# Patient Record
Sex: MALE | Race: WHITE | NOT HISPANIC OR LATINO | Employment: OTHER | URBAN - METROPOLITAN AREA
[De-identification: names, ages, dates, MRNs, and addresses within clinical notes are randomized per-mention and may not be internally consistent; named-entity substitution may affect disease eponyms.]

---

## 2017-08-07 NOTE — PATIENT DISCUSSION
disc with patient decrease reliability of so's due to prev lvc. may need lvc enh po to increase bcva.

## 2017-11-28 NOTE — PATIENT DISCUSSION
The patient feels that the cataract is significantly impacting daily activities and has elected cataract surgery. The risks, benefits, and alternatives to surgery were discussed. The patient elects to proceed with surgery. Ventricular tachycardia

## 2021-04-08 ENCOUNTER — PREPPED CHART (OUTPATIENT)
Dept: URBAN - METROPOLITAN AREA CLINIC 35 | Facility: CLINIC | Age: 66
End: 2021-04-08

## 2021-05-03 NOTE — PATIENT DISCUSSION
CME Prophy before IOL SX OS Ilevro QD 2d before sx and 4 wk after sx. OCCUPATIONAL THERAPY EVALUATION  Patient: Echo Calabrese (38 y.o. female)  Date: 5/3/2021  Primary Diagnosis: Accelerated hypertension [I10]        Precautions: fall risk       ASSESSMENT  Pt is a 65 y/o F with PMH of CVA, diabetes mellitus, hypertension, heart failure presents to the [facility] ED complaining of not feeling good and weak. States that she was keep falling, having trouble with ambulation. Denies any chest pain, palpitations. Per medical chart, on evaluation found that she has significantly elevated blood pressure and due to the risk factors admitted for further management. She was comfortably laying in the bed, did not notice any trouble breathing or shortness of breath. Was given hydralazine and clonidine in the emergency room with not much improvement. Pt admitted 4/30/21 and being treated for accelerated hypertension. Pt received semi-supine in bed upon arrival, AXO x4, and agreeable to OT/PT evaluation at this time. Per pt report, pt lives alone in a one-story home with 3 JAMES and B HR, was IND with ADLs and Mod I using RW for mobility at [facility]. Per pt/chart, pt was recently admitted to [facility] in February, treated for CVA, pt d/c to [facility] 2/17/21. Pt states she was there for \"about 40 days\" then was d/c home and receiving HHPT/OT and speech services prior to arrival. Pt reports she has a  that assist with IADLs, also owns a RW and SPC. Based on current observations, pt presents with deficits in generalized strength/AROM, static/dynamic sitting balance, bed mobility, functional activity tolerance, and reported increased B foot pain impacting overall performance of ADLs and functional transfers/mobility. Pt currently requires SBA supine>sit with fair sitting balance noted EOB; able to pull up/adjust socks at ankles, however with limited anterior reach anticipated min A to don socks at this time.  Pt scoots forward with CGA, tolerates approx 10 minutes seated EOB, attempted STS however pt reports B foot pain and unable to progress to OOB transfer at this time. Sit>supine completed with CGA back into bed and pt performs basic face washing task s/p setup in long sitting. Overall, pt tolerates session fair today, limited mostly by pain to B feet today, RN made aware. Pt would benefit from continued skilled OT services to address current impairments and improve overall IND and safety with self cares and functional transfers/mobility. Recommend discharge to SNF vs. [facility] pending progress during hospital stay and once medically appropriate. Other factors to consider for discharge: family/social support, DME, time since onset, severity of deficits      Patient will benefit from skilled therapy intervention to address the above noted impairments. PLAN :  Recommendations and Planned Interventions: self care training, functional mobility training, therapeutic exercise, balance training, therapeutic activities, endurance activities, neuromuscular re-education, patient education, home safety training, and family training/education    Frequency/Duration: Patient will be followed by occupational therapy 5 times a week to address goals.     Recommendation for discharge: (in order for the patient to meet his/her long term goals)  SNF vs. [facility] pending progress    This discharge recommendation:  Has been made in collaboration with the attending provider and/or case management    IF patient discharges home will need the following DME: TBD       SUBJECTIVE:   Patient stated I can't stand right now my feet hurt    OBJECTIVE DATA SUMMARY:   HISTORY:   Past Medical History:   Diagnosis Date    Blindness/low vision 2008    due to dm    CHF (congestive heart failure) ([facility] [address]     Chronic kidney disease     Chronic pain     SPINAL STENOSIS    DM (diabetes mellitus) ([facility] [address]     HTN (hypertension)     Stroke [facility])      Past Surgical History:   Procedure Laterality Date    HX OOPHORECTOMY      STENT INSERTION [id number]    Blanca Taran       Expanded or extensive additional review of patient history:     Home Situation  Home Environment: Private residence  # Steps to Enter: 3  Rails to Enter: Yes  One/Two Story Residence: One story  Living Alone: Yes  Support Systems: Other (comments)(caregiver 3 x week)  Patient Expects to be Discharged to[de-identified] Other (comment)(TBD)  Current DME Used/Available at Home: Walker, rolling    EXAMINATION OF PERFORMANCE DEFICITS:  Cognitive/Behavioral Status:  Neurologic State: Alert  Orientation Level: Oriented X4  Cognition: Follows commands    Hearing: Auditory  Auditory Impairment: None    Vision/Perceptual:     WFL      Range of Motion:  AROM: Generally decreased, functional    Strength:  Strength: Generally decreased, functional    Coordination:  Fine Motor Skills-Upper: Left Intact; Right Intact    Gross Motor Skills-Upper: Left Intact; Right Intact    Tone & Sensation:  Sensation: Intact    Balance:  Sitting: Impaired; With support  Sitting - Static: Good (unsupported)  Sitting - Dynamic: Fair (occasional)    Functional Mobility and Transfers for ADLs:  Bed Mobility:  Rolling: Stand-by assistance  Supine to Sit: Stand-by assistance  Sit to Supine: Minimum assistance  Scooting: Contact guard assistance    ADL Intervention and task modifications:  Grooming  Grooming Assistance: Set-up; Stand-by assistance  Position Performed: Long sitting on bed  Washing Face: Set-up; Stand-by assistance    Lower Body Dressing Assistance  Socks: Minimum assistance(Simulated; able to reach ankles)  Position Performed: Seated edge of bed    Therapeutic Exercise:  Pt would benefit from UE HEP to improve overall UE AROM/strength and can be further educated in next treatment session. Functional Measure:    [address] [facility] [address] [address] AM-PACTM \"6 Clicks\"                                                       Daily Activity Inpatient Short Form  How much help from another person does the patient currently need. ..  Total; A Lot A Little None   1. Putting on and taking off regular lower body clothing? []  1 [x]  2 []  3 []  4   2. Bathing (including washing, rinsing, drying)? []  1 [x]  2 []  3 []  4   3. Toileting, which includes using toilet, bedpan or urinal? [] 1 [x]  2 []  3 []  4   4. Putting on and taking off regular upper body clothing? []  1 []  2 [x]  3 []  4   5. Taking care of personal grooming such as brushing teeth? []  1 []  2 [x]  3 []  4   6. Eating meals? []  1 []  2 [x]  3 []  4   © , Trustees of [facility] [employer], under license to [employer]. All rights reserved     Score: 15/24     Interpretation of Tool:  Represents clinically-significant functional categories (i.e. Activities of daily living). Percentage of Impairment CH    0%   CI    1-19% CJ    20-39% CK    40-59% CL    60-79% CM    80-99% CN     100%   [facility]  Score 6-24 24 23 20-22 15-19 10-14 7-9 6        Occupational Therapy Evaluation Charge Determination   History Examination Decision-Making   LOW Complexity : Brief history review  LOW Complexity : 1-3 performance deficits relating to physical, cognitive , or psychosocial skils that result in activity limitations and / or participation restrictions  MEDIUM Complexity : Patient may present with comorbidities that affect occupational performnce. Miniml to moderate modification of tasks or assistance (eg, physical or verbal ) with assesment(s) is necessary to enable patient to complete evaluation       Based on the above components, the patient evaluation is determined to be of the following complexity level: LOW   Pain Ratin/10 B feet    Activity Tolerance:   Fair  Please refer to the flowsheet for vital signs taken during this treatment.     After treatment patient left in no apparent distress:    Supine in bed, Call bell within reach, Bed / chair alarm activated, and Side rails x 3    COMMUNICATION/EDUCATION:   The patients plan of care was discussed with: Physical therapist and Registered nurse. Patient/family have participated as able in goal setting and plan of care. and Patient/family agree to work toward stated goals and plan of care. This patients plan of care is appropriate for delegation to TODD.     OT/PT sessions occurred together for increased patient and clinician safety as pt requires A of 2 for mobility at this time    Thank you for this referral.  John Luther  Time Calculation: 25 mins   Problem: Self Care Deficits Care Plan (Adult)  Goal: *Acute Goals and Plan of Care (Insert Text)  Description: Pt will be IND sup <> sit in prep for EOB ADLs  Pt will be Mod I grooming standing sink side LRAD  Pt will be Mod I LE dressing sitting EOB/long sit  Pt will be Mod I sit <>  prep for toileting LRAD  Pt will be Mod I toileting/toilet transfer/cloth mgmt LRAD  Pt will be IND following UE HEP in prep for self care tasks     Outcome: Not Met

## 2025-07-29 NOTE — PATIENT DISCUSSION
"Stefanie Clayton (76 y.o. Female)     Bryanna Rodrigues, RN  Utilization Review  [phone]  [phone]      OBS to INPT request           Date of Birth   1949    Social Security Number       Address   [address] [place] [address]    Home Phone   [phone]    MRN   [id number]       Restorationist   [facility]    Marital Status                               Admission Date   2025    Admission Type   Emergency    Admitting Provider   Dasha Spangler DO    Attending Provider   Dasha Spangler DO    Department, Room/Bed   [facility] 6B, N640/1       Discharge Date       Discharge Disposition       Discharge Destination                                 Attending Provider: Dasha Spangler DO    Allergies: No Known Allergies    Isolation: None   Infection: None   Code Status: CPR    Ht: 144.8 cm (57.01\")   Wt: 72 kg (158 lb 11.7 oz)    Admission Cmt: None   Principal Problem: Acute exacerbation of chronic obstructive pulmonary disease (COPD) [J44.1]                   Active Insurance as of 2025       Primary Coverage       Payor Plan Insurance Group Employer/Plan Group    [facility] MEDICARE REPLACEMENT [facility] MEDICARE ADVANTAGE PPO        Payor Plan Address Payor Plan Phone Number Payor Plan Fax Number Effective Dates    [address] [address]   2025 - None Entered    [facility] [id number]         Subscriber Name Subscriber Birth Date Member ID       STEFANIE CLAYTON 1949 [id number]                     Emergency Contacts        (Rel.) Home Phone Work Phone Mobile Phone    Inez Alvarez (Daughter) [phone] -- [phone]    Drew,Sister [phone] -- --                 History & Physical        Sayra Churchill DO at 25 0649              [facility] Medicine Services  HISTORY AND PHYSICAL    Patient Name: Stefanie Clayton  : 1949  MRN: [id number]  Primary Care Physician: Eric Brunson " Recommended artificial tears and warm compresses. DO RANDI  Date of admission: 7/27/2025      Subjective  Subjective     Chief Complaint:  Shortness of breath    HPI:  Stefanie Russell is a 76 y.o. female with history of hypertension, mood disorder who developed worsening shortness of breath over the last 48 hours.  She states she having trouble catching her breath with normal activities.  She also notes some fullness in her abdomen but not sure if she is actually gained any weight.  She does not take a diuretic at baseline.  She recently had a significant stressful event about 3 months ago with the death of a great nephew.      Personal History     Past Medical History:   Diagnosis Date    High cholesterol     History of pelvic fracture 1/8/2018    Hyperlipidemia     Hypertension     Osteoarthritis     Osteoporosis            Past Surgical History:   Procedure Laterality Date    COLON SURGERY      HYSTERECTOMY      SHOULDER SURGERY      rotator cuff (left)    TONSILLECTOMY         Family History: family history includes Cancer in her mother; Heart attack in her father; Heart disease in her father; Heart failure in her father; Hypertension in her father and mother; Lymphoma in her mother; Melanoma in her mother; Osteoarthritis in her mother; Stroke in her father.     Social History:  reports that she has never smoked. She has never used smokeless tobacco. She reports that she does not drink alcohol and does not use drugs.  Social History     Social History Narrative    Not on file       Medications:  Available home medication information reviewed.  DULoxetine, QUEtiapine, alendronate, atenolol, busPIRone, calcium carb-cholecalciferol, cephalexin, citalopram, diclofenac, fluticasone, lisinopril, lovastatin, silver sulfadiazine, and venlafaxine XR    No Known Allergies    Objective  Objective     Vital Signs:   Temp:  [98.2 °F (36.8 °C)] 98.2 °F (36.8 °C)  Heart Rate:  [] 107  Resp:  [18] 18  BP: (141-169)/() 152/77  Flow (L/min) (Oxygen Therapy):  [2] 2        Physical Exam  Constitutional:       General: She is not in acute distress.     Appearance: Normal appearance.   HENT:      Mouth/Throat:      Mouth: Mucous membranes are dry.   Cardiovascular:      Rate and Rhythm: Normal rate and regular rhythm.   Pulmonary:      Effort: No respiratory distress.      Comments: Conversational dyspnea  Abdominal:      General: There is distension.      Palpations: Abdomen is soft.      Tenderness: There is no abdominal tenderness.   Musculoskeletal:      Comments: Trace LE edema   Skin:     General: Skin is warm and dry.   Neurological:      Mental Status: She is alert and oriented to person, place, and time.            Result Review:  I have personally reviewed the results from the time of this admission to 7/27/2025 07:59 EDT and agree with these findings:  [x]  Laboratory list / accordion  []  Microbiology  [x]  Radiology  []  EKG/Telemetry   []  Cardiology/Vascular   []  Pathology  [x]  Old records  []  Other:  Most notable findings include:       LAB RESULTS:      Lab 07/27/25  0545 07/27/25 0433   WBC  --  11.06*   HEMOGLOBIN  --  15.9   HEMATOCRIT  --  49.2*   PLATELETS  --  301   NEUTROS ABS  --  6.18   IMMATURE GRANS (ABS)  --  0.05   LYMPHS ABS  --  3.02   MONOS ABS  --  1.30*   EOS ABS  --  0.39   MCV  --  92.8   PROCALCITONIN 0.06  --          Lab 07/27/25  0545 07/27/25 0433   SODIUM  --  140   POTASSIUM  --  3.9   CHLORIDE  --  106   CO2  --  21.8*   ANION GAP  --  12.2   BUN  --  23.0   CREATININE  --  1.31*   EGFR  --  41.8*   GLUCOSE  --  116*   CALCIUM  --  8.9   TSH 1.050  --          Lab 07/27/25 0433   TOTAL PROTEIN 7.2   ALBUMIN 3.9   GLOBULIN 3.3   ALT (SGPT) 6   AST (SGOT) 17   BILIRUBIN 0.5   ALK PHOS 80         Lab 07/27/25  0545 07/27/25 0433   PROBNP  --  432.0   HSTROP T 11 14*                 Lab 07/27/25  0449   PH, ARTERIAL 7.440   PCO2, ARTERIAL 33.2*   PO2 ART 70.6*   FIO2 21   HCO3 ART 22.5   BASE EXCESS ART -0.8*   CARBOXYHEMOGLOBIN 1.3          Microbiology Results (last 10 days)       ** No results found for the last 240 hours. **            XR Chest 1 View  Result Date: 7/27/2025  XR CHEST 1 VW Date of Exam: 7/27/2025 4:47 AM EDT Indication: SOA triage protocol Comparison: 3/25/2022. Findings: There are no airspace consolidations. Small bilateral pleural effusions are present.. No pneumothorax. The pulmonary vasculature appears within normal limits. The cardiac and mediastinal silhouette appear unremarkable. No acute osseous abnormality identified.     Impression: Impression: Small bilateral pleural effusions. Electronically Signed: Tracey Reddy MD  7/27/2025 5:50 AM EDT  Workstation ID: [id number]          Assessment & Plan  Assessment & Plan       Acute exacerbation of chronic obstructive pulmonary disease (COPD)        76-year-old lady with history of hypertension admitted for worsening shortness of breath, suspected volume overload    Dyspnea  - Likely secondary to volume overload  - Lasix 60 mg IV x 1  - Check echo  - Daily weights  - Replace electrolytes per protocol  - Check TSH, T4    Hypertension  - Hold lisinopril, unsure what her baseline renal function is  - Continue atenolol with hold parameters    ?  PABLO versus CKD  - Monitor renal function for now  - Check UA    Mood disorder  - Continue home meds        VTE Prophylaxis:  Mechanical VTE prophylaxis orders are present.          CODE STATUS:    Code Status and Medical Interventions: CPR (Attempt to Resuscitate); Full Support   Ordered at: 07/27/25 0649     Code Status (Patient has no pulse and is not breathing):    CPR (Attempt to Resuscitate)     Medical Interventions (Patient has pulse or is breathing):    Full Support       Expected Discharge   Expected discharge date/ time has not been documented.     Sayra Churchill DO  07/27/25     Electronically signed by Sayra Churchill DO at 07/27/25 0800          Emergency Department Notes        Kari Mccauley RN at  07/27/25 0651           Stefanie Russell    Nursing Report ED to Floor:  Mental status: a&ox4  Ambulatory status: non ambulatory in ed/ rolator  Oxygen Therapy:  3l nc, RA baseline  Cardiac Rhythm: nsr  Admitted from: home  Safety Concerns:  na  Precautions: na  Social Issues: na  ED Room #:  20    ED Nurse Phone Extension - [phone] or may call [phone].      HPI:   Chief Complaint   Patient presents with    Shortness of Breath       Past Medical History:  Past Medical History:   Diagnosis Date    High cholesterol     History of pelvic fracture 1/8/2018    Hyperlipidemia     Hypertension     Osteoarthritis     Osteoporosis         Past Surgical History:  Past Surgical History:   Procedure Laterality Date    COLON SURGERY      HYSTERECTOMY      SHOULDER SURGERY      rotator cuff (left)    TONSILLECTOMY          Admitting Doctor:   Charlie Laughlin III, DO    Consulting Provider(s):  Consults       No orders found from 6/28/2025 to 7/28/2025.             Admitting Diagnosis:   The primary encounter diagnosis was Acute respiratory failure with hypoxia. Diagnoses of COPD with acute exacerbation, Moderate mixed hyperlipidemia not requiring statin therapy, and Primary hypertension were also pertinent to this visit.    Most Recent Vitals:   Vitals:    07/27/25 0500 07/27/25 0530 07/27/25 0600 07/27/25 0630   BP: 152/84 158/85 142/82 147/84   Pulse: 99 84 89 92   Resp:       Temp:       SpO2: 90% 91% (!) 89% 90%   Weight:       Height:           Active LDAs/IV Access:   Lines, Drains & Airways       Active LDAs       Name Placement date Placement time Site Days    PICC Double Lumen 04/23/18 Right Basilic 04/23/18  1118  Basilic  2651    Peripheral IV 07/27/25 0439 20 G Right Antecubital 07/27/25  0439  Antecubital  less than 1    Peripheral IV 07/27/25 0440 20 G Right Antecubital 07/27/25  0440  Antecubital  less than 1                    Labs (abnormal labs have a star):   Labs Reviewed   COMPREHENSIVE METABOLIC PANEL -  Abnormal; Notable for the following components:       Result Value    Glucose 116 (*)     Creatinine 1.31 (*)     CO2 21.8 (*)     eGFR 41.8 (*)     All other components within normal limits    Narrative:     GFR Categories in Chronic Kidney Disease (CKD)              GFR Category          GFR (mL/min/1.73)    Interpretation  G1                    90 or greater        Normal or high (1)  G2                    60-89                Mild decrease (1)  G3a                   45-59                Mild to moderate decrease  G3b                   30-44                Moderate to severe decrease  G4                    15-29                Severe decrease  G5                    14 or less           Kidney failure    (1)In the absence of evidence of kidney disease, neither GFR category G1 or G2 fulfill the criteria for CKD.    eGFR calculation 2021 CKD-EPI creatinine equation, which does not include race as a factor   TROPONIN - Abnormal; Notable for the following components:    HS Troponin T 14 (*)     All other components within normal limits    Narrative:     High Sensitive Troponin T Reference Range:  <14.0 ng/L- Negative Female for AMI  <22.0 ng/L- Negative Male for AMI  >=14 - Abnormal Female indicating possible myocardial injury.  >=22 - Abnormal Male indicating possible myocardial injury.   Clinicians would have to utilize clinical acumen, EKG, Troponin, and serial changes to determine if it is an Acute Myocardial Infarction or myocardial injury due to an underlying chronic condition.        CBC WITH AUTO DIFFERENTIAL - Abnormal; Notable for the following components:    WBC 11.06 (*)     RBC 5.30 (*)     Hematocrit 49.2 (*)     Monocytes, Absolute 1.30 (*)     All other components within normal limits   BLOOD GAS, ARTERIAL W/CO-OXIMETRY - Abnormal; Notable for the following components:    pCO2, Arterial 33.2 (*)     pO2, Arterial 70.6 (*)     Base Excess, Arterial -0.8 (*)     Oxyhemoglobin 93.7 (*)     pCO2,  Temperature Corrected 33.2 (*)     pO2, Temperature Corrected 70.6 (*)     All other components within normal limits   BNP (IN-HOUSE) - Normal    Narrative:     This assay is used as an aid in the diagnosis of individuals suspected of having heart failure. It can be used as an aid in the diagnosis of acute decompensated heart failure (ADHF) in patients presenting with signs and symptoms of ADHF to the emergency department (ED). In addition, NT-proBNP of <300 pg/mL indicates ADHF is not likely.    Age Range Result Interpretation  NT-proBNP Concentration (pg/mL:      <50             Positive            >450                   Gray                 300-450                    Negative             <300    50-75           Positive            >900                  Gray                300-900                  Negative            <300      >75             Positive            >1800                  Gray                300-1800                  Negative            <300   RAINBOW DRAW    Narrative:     The following orders were created for panel order [place] Draw.  Procedure                               Abnormality         Status                     ---------                               -----------         ------                     Green Top (Gel)[[id number]]                                  Final result               Lavender Top[[id number]]                                     Final result               Gold Top - SST[[id number]]                                   Final result               Haque Top[[id number]]                                         Final result               Light Blue Top[[id number]]                                   Final result                 Please view results for these tests on the individual orders.   BLOOD GAS, ARTERIAL   HIGH SENSITIVITIY TROPONIN T 1HR    Narrative:     High Sensitive Troponin T Reference Range:  <14.0 ng/L- Negative Female for AMI  <22.0 ng/L- Negative Male for AMI  >=14 - Abnormal  Female indicating possible myocardial injury.  >=22 - Abnormal Male indicating possible myocardial injury.   Clinicians would have to utilize clinical acumen, EKG, Troponin, and serial changes to determine if it is an Acute Myocardial Infarction or myocardial injury due to an underlying chronic condition.        PROCALCITONIN   TSH RFX ON ABNORMAL TO FREE T4   URINALYSIS W/ MICROSCOPIC IF INDICATED (NO CULTURE)   CBC AND DIFFERENTIAL    Narrative:     The following orders were created for panel order CBC & Differential.  Procedure                               Abnormality         Status                     ---------                               -----------         ------                     CBC Auto Differential[[id number]]        Abnormal            Final result                 Please view results for these tests on the individual orders.   GREEN TOP   LAVENDER TOP   GOLD TOP - SST   GRAY TOP   LIGHT BLUE TOP       Meds Given in ED:   Medications   sodium chloride 0.9 % flush 10 mL (has no administration in time range)   sodium chloride 0.9 % flush 10 mL (has no administration in time range)   sodium chloride 0.9 % flush 10 mL (has no administration in time range)   sodium chloride 0.9 % infusion 40 mL (has no administration in time range)   Potassium Replacement - Follow Nurse / BPA Driven Protocol (has no administration in time range)   Magnesium Standard Dose Replacement - Follow Nurse / BPA Driven Protocol (has no administration in time range)   Phosphorus Replacement - Follow Nurse / BPA Driven Protocol (has no administration in time range)   Calcium Replacement - Follow Nurse / BPA Driven Protocol (has no administration in time range)   acetaminophen (TYLENOL) tablet 650 mg (has no administration in time range)     Or   acetaminophen (TYLENOL) 160 MG/5ML oral solution 650 mg (has no administration in time range)     Or   acetaminophen (TYLENOL) suppository 650 mg (has no administration in time range)    sennosides-docusate (PERICOLACE) 8.6-50 MG per tablet 2 tablet (has no administration in time range)     And   polyethylene glycol (MIRALAX) packet 17 g (has no administration in time range)     And   bisacodyl (DULCOLAX) EC tablet 5 mg (has no administration in time range)     And   bisacodyl (DULCOLAX) suppository 10 mg (has no administration in time range)   ondansetron (ZOFRAN) injection 4 mg (has no administration in time range)   ipratropium-albuterol (DUO-NEB) nebulizer solution 3 mL (3 mL Nebulization Given 7/27/25 0440)   methylPREDNISolone sodium succinate (SOLU-Medrol) injection 125 mg (125 mg Intravenous Given 7/27/25 0440)   ipratropium-albuterol (DUO-NEB) nebulizer solution 3 mL (3 mL Nebulization Given 7/27/25 0646)           Last NIH score:                                                          Dysphagia screening results:        Romi Coma Scale:  No data recorded     CIWA:        Restraint Type:            Isolation Status:  No active isolations           Electronically signed by Kari Mccauley RN at 07/27/25 0652       Clint Contreras MD at 07/27/25 0452        Procedure Orders    1. ECG 12 Lead Dyspnea [[id number]] ordered by Clint Contreras MD                 Subjective   History of Present Illness  76-year-old female with a history of COPD presenting to the emergency department with cough and shortness of breath.  Patient has had some shortness of breath for the last week, however it has been intensifying over the last 24 hours.  She was unable to catch her breath this morning which prompted her to come to the emergency department.  Patient has had a headache mild nonproductive cough.  She has had some wheezing.  She does not normally wear oxygen at home.  Denies any fevers or chills.  No headache or change in vision.  No focal weakness.  No abdominal pain or vomiting.  No chest pain    History provided by:  Patient   used: No        Review of Systems    Constitutional:  Negative for chills and fever.   HENT:  Negative for congestion, ear pain and sore throat.    Eyes:  Negative for visual disturbance.   Respiratory:  Positive for cough, shortness of breath and wheezing.    Cardiovascular:  Negative for chest pain.   Gastrointestinal:  Negative for abdominal pain.   Genitourinary:  Negative for difficulty urinating.   Musculoskeletal:  Negative for arthralgias.   Skin:  Negative for rash.   Neurological:  Negative for dizziness, weakness and numbness.   Psychiatric/Behavioral:  Negative for agitation.        Past Medical History:   Diagnosis Date    High cholesterol     History of pelvic fracture 1/8/2018    Hyperlipidemia     Hypertension     Osteoarthritis     Osteoporosis        No Known Allergies    Past Surgical History:   Procedure Laterality Date    COLON SURGERY      HYSTERECTOMY      SHOULDER SURGERY      rotator cuff (left)    TONSILLECTOMY         Family History   Problem Relation Age of Onset    Lymphoma Mother     Melanoma Mother     Cancer Mother     Osteoarthritis Mother     Hypertension Mother     Stroke Father     Heart disease Father     Heart failure Father     Hypertension Father     Heart attack Father     Breast cancer Neg Hx     Ovarian cancer Neg Hx        Social History     Socioeconomic History    Marital status:    Tobacco Use    Smoking status: Never    Smokeless tobacco: Never   Vaping Use    Vaping status: Never Used   Substance and Sexual Activity    Alcohol use: No    Drug use: No    Sexual activity: Defer     Partners: Male           Objective   Physical Exam  Vitals and nursing note reviewed.   Constitutional:       General: She is not in acute distress.     Appearance: She is not ill-appearing or toxic-appearing.   Eyes:      Conjunctiva/sclera: Conjunctivae normal.   Cardiovascular:      Rate and Rhythm: Normal rate and regular rhythm.   Pulmonary:      Effort: Tachypnea and respiratory distress present.      Breath  sounds: Wheezing present.   Abdominal:      General: Abdomen is flat. There is no distension.      Palpations: There is no mass.      Tenderness: There is no abdominal tenderness. There is no guarding or rebound.   Musculoskeletal:         General: No deformity. Normal range of motion.   Skin:     General: Skin is warm.      Findings: No rash.   Neurological:      General: No focal deficit present.      Mental Status: She is alert and oriented to person, place, and time.      Motor: No weakness.         ECG 12 Lead Dyspnea      Date/Time: 7/27/2025 4:56 AM    Performed by: Clint Contreras MD  Authorized by: Clint Contreras MD  Interpreted by ED physician  Comparison: compared with previous ECG   Similar to previous ECG  Rhythm: sinus rhythm  Rate: normal  BPM: 87  QRS axis: normal  Conduction: conduction normal  ST Segments: ST segments normal  Other: no other findings  Clinical impression: normal ECG  Comments: Interpretation:  EKG was directly visualized by myself, interpretations as documented in hospital course.              ED Course  ED Course as of 07/27/25 0606   Sun Jul 27, 2025   0458 BP(!): 169/105 [JK]   0458 Temp: 98.2 °F (36.8 °C) [JK]   0458 Heart Rate: 96 [JK]   0458 Resp: 18 [JK]   0458 SpO2(!): 89 %  Interpretation:  Patient's repeat vitals, telemetry tracing, and pulse oximetry tracing were directly viewed and interpreted by myself.   O2 sat 89% on room air, interpreted as hypoxia.  Telemetry rhythm strip revealed a rate of 96 bpm, interpreted as normal sinus rhythm [JK]   0458 Blood Gas, Arterial With Co-Ox(!)  Interpretation:  Patient's Blood Gas was directly viewed and interpreted by myself.   Hypoxia [JK]   0547 High Sensitivity Troponin T(!) [JK]   0547 Comprehensive Metabolic Panel(!) [JK]   0547 CBC & Differential(!)  Interpretation:  Laboratory studies were reviewed and interpreted directly by myself.  CMP shows some minor elevation creatinine 1.31, troponin was emotional 14, CBC  showed some minor leukocytosis with white blood cell count of 11 [JK]   0603 XR Chest 1 View  Interpretation:  Imaging was directly visualized by myself, per my interpretations, chest x-ray showed some small bilateral pleural effusions. [JK]   0604 Patient continues to have some mild hypoxia despite additional DuoNeb therapy.  Findings consistent with respiratory failure secondary to COPD exacerbation.  Patient be admitted to hospital for further evaluation and treatment [JK]   0605 Based on the patient's presentation, history and diffuse work-up in the emergency department, the patient is deemed appropriate for admission to the hospital for further evaluation and treatment.  This was discussed with the patient at bedside.  They are in agreement with the current medical management.    Admitting physician: Dr. Laughlin    Discussion was had with admitting physician regarding the laboratory and imaging findings.  We did discuss current therapeutics in the emergency department and progression of the patient.  Working diagnosis was conveyed to the admitting physician, as well as current status and prognosis for the patient.  They are in agreement with these findings and have accepted admission.    Shared decision making:   After full review of the patient's clinical presentation, review of any work-up including but not limited to laboratory studies and radiology obtained, I had a discussion with the patient.  Treatment options were discussed as well as the risks, benefits and consequences.  I discussed all findings with the patient and family members if available.  During the discussion, treatment goals were understood by all as well as any misconceptions which were addressed with the patient.  Ample time was given for any questions they may have had.  They are in agreement with the treatment plan as well as final disposition. [JK]      ED Course User Index  [JK] Clint Contreras MD                                                        Medical Decision Making  This is a a 76-year-old female presenting with some shortness of breath.  The patient is hypoxic on initial presentation and appears to be in moderate respiratory distress.  She does have diffuse wheezing.  Symptoms seem consistent with bronchitis possibly pneumonia with respiratory failure.  Patient's overall presentation is deemed critical.  Given the initial presentation and past medical history, the patient has a high risk of serious morbidity and mortality.  Patient was immediately evaluated in the Emergency Department.  IV access was established in the patient.  Placed on continuous telemetry monitoring.  Supplied supplemental oxygen.  Patient ministered DuoNeb therapy as well as supplemental steroids.  Differential is broad and will require extensive treatment and evaluation.  Workup initiated.      Differential diagnosis: COPD exacerbation, acute respiratory failure, bronchitis, pneumonia, CHF, CAD, dysrhythmia, acute kidney injury, electrolyte abnormality, anemia      Amount and/or Complexity of Data Reviewed  Independent Historian: caregiver  External Data Reviewed: labs, radiology, ECG and notes.     Details: External laboratories, imaging as well as notes were reviewed personally by myself.  All relevant studies were used to guide decision making.     Date of previous record: 4/25/2025    Source of note: PCP    Summary:  Patient was seen and evaluated for routine visit.  I did review basic laboratory studies on file as well as a previous chest x-ray and EKG.  Records reviewed    Labs: ordered. Decision-making details documented in ED Course.  Radiology: ordered and independent interpretation performed. Decision-making details documented in ED Course.  ECG/medicine tests: ordered and independent interpretation performed. Decision-making details documented in ED Course.    Risk  Prescription drug management.    Critical Care  Total time providing critical care:  35 minutes (Authorized and performed by: Clint Contreras MD  I personally spent a total of 35 minutes of critical care time with the patient.  Due to the high probability of clinically significant, life-threatening deterioration, the patient required my highest level of care to intervene emergently.  These interventions, including, but not limited to, establishing IV access, continuous pulse oximeter and telemetry monitoring, frequent monitoring and reevaluations, management the patient's airway and cardiovascular system, discussion with other consultants as needed, which bear directly on the management the patient.  This also includes obtaining history, examining the patient, frequent reevaluations and coordinating high level of care.  Failure to emergently initiate these interventions would carry a high probability of resulting in sudden, clinically significant or life threatening deterioration in the patient's condition.  This does not include time spent on separately reported billable procedures.)        Final diagnoses:   Acute respiratory failure with hypoxia   COPD with acute exacerbation   Moderate mixed hyperlipidemia not requiring statin therapy   Primary hypertension       ED Disposition  ED Disposition       ED Disposition   Decision to Admit    Condition   --    Comment   --               No follow-up provider specified.       Medication List      No changes were made to your prescriptions during this visit.            Clint Contreras MD  07/27/25 0606      Electronically signed by Clint Contreras MD at 07/27/25 0606       Vital Signs (last 2 days)       Date/Time Temp Temp src Pulse Resp BP Patient Position SpO2    07/29/25 1229 -- -- 83 16 -- -- 96    07/29/25 1110 97.6 (36.4) Oral 78 16 130/64 Sitting 95    07/29/25 0812 97.4 (36.3) Axillary -- 18 142/85 Sitting 93    07/29/25 0746 -- -- 80 18 -- -- 94    07/29/25 0456 98.5 (36.9) Axillary 74 18 111/58 Lying 95    07/29/25 0027 97.6  (36.4) Axillary 90 18 116/63 Lying 92    07/29/25 0000 -- -- 85 -- -- -- --    07/28/25 2347 -- -- 86 16 -- Lying 93    07/28/25 2051 98.2 (36.8) Axillary 95 16 145/68 Lying 91    07/28/25 1911 -- -- 88 18 -- Sitting 92    07/28/25 1709 -- -- 84 -- 140/59 -- --    07/28/25 1658 -- -- 81 18 -- Sitting 97    07/28/25 1651 98 (36.7) Oral 84 16 148/84 Lying 96    07/28/25 1315 -- -- 83 -- 143/74 -- --    07/28/25 1242 -- -- 76 16 -- -- 97    07/28/25 1122 97.9 (36.6) Oral 81 16 164/85 Lying 95    07/28/25 0933 -- -- 77 20 -- -- 95    07/28/25 0810 98 (36.7) Oral 81 20 153/80 Sitting --    07/28/25 0809 -- -- 80 -- -- -- 92    07/28/25 0415 98.4 (36.9) Oral 81 18 147/73 Lying 93    07/27/25 2343 98.3 (36.8) Oral 76 18 138/78 Lying 92    07/27/25 2034 98.4 (36.9) Oral 78 18 138/74 Lying 90    07/27/25 1648 -- -- 77 16 -- -- 92    07/27/25 1410 98.1 (36.7) Oral 86 16 135/82 Sitting 93    07/27/25 0929 -- -- 101 -- -- -- 92    07/27/25 0821 97.9 (36.6) Oral 119 20 153/100 Lying 90    07/27/25 0730 -- -- 107 -- 152/77 -- 91    07/27/25 0700 -- -- 98 -- 162/68 -- 90    07/27/25 0630 -- -- 92 -- 147/84 -- 90    07/27/25 0600 -- -- 89 -- 142/82 -- 89    07/27/25 0530 -- -- 84 -- 158/85 -- 91    07/27/25 0500 -- -- 99 -- 152/84 -- 90    07/27/25 0440 -- -- 89 18 -- -- 89    07/27/25 0430 -- -- 92 -- 141/108 -- 89    07/27/25 0410 98.2 (36.8) -- 96 18 169/105 -- 93          Oxygen Therapy (last 2 days)       Date/Time SpO2 Device (Oxygen Therapy) Flow (L/min) (Oxygen Therapy) Oxygen Concentration (%) ETCO2 (mmHg)    07/29/25 1229 96 nasal cannula 3 -- --    07/29/25 1110 95 -- -- -- --    07/29/25 0812 93 nasal cannula 3 -- --    07/29/25 0746 94 nasal cannula 3 -- --    07/29/25 0706 -- nasal cannula 3 -- --    07/29/25 0600 -- nasal cannula 4 -- --    07/29/25 0456 95 -- -- -- --    07/29/25 0400 -- humidified;nasal cannula 4 -- --    07/29/25 0200 -- humidified;nasal cannula 4 -- --    07/29/25 0027 92 -- -- -- --    07/29/25  0000 -- humidified;nasal cannula 4 -- --    07/28/25 2347 93 humidified;nasal cannula 4 -- --    07/28/25 2200 -- humidified;nasal cannula 4 -- --    07/28/25 2051 91 humidified;nasal cannula 2 -- --    07/28/25 1911 92 humidified;nasal cannula 2 -- --    07/28/25 1800 -- humidified;nasal cannula 2 -- --    07/28/25 1658 97 nasal cannula;humidified 2 -- --    07/28/25 1651 96 -- -- -- --    07/28/25 1600 -- humidified;nasal cannula 2 -- --    07/28/25 1400 -- humidified;nasal cannula 2 -- --    07/28/25 1300 -- humidified;nasal cannula 3 -- --    07/28/25 1242 97 humidified;nasal cannula 4 -- --    07/28/25 1200 -- humidified;nasal cannula 4 -- --    07/28/25 1122 95 -- -- -- --    07/28/25 1000 -- humidified;nasal cannula 4 -- --    07/28/25 0933 95 humidified;nasal cannula 4 -- --    07/28/25 0810 -- humidified;nasal cannula 4 -- --    07/28/25 0809 92 -- -- -- --    07/28/25 0600 -- humidified;nasal cannula 4 -- --    07/28/25 0415 93 -- -- -- --    07/28/25 0400 -- humidified;nasal cannula 4 -- --    07/28/25 0200 -- humidified;nasal cannula 4 -- --    07/28/25 0000 -- nasal cannula;humidified 4 -- --    07/27/25 2343 92 -- -- -- --    07/27/25 2200 -- humidified;nasal cannula 4 -- --    07/27/25 2034 90 humidified;nasal cannula 4 -- --    07/27/25 1800 -- humidified;nasal cannula 4 -- --    07/27/25 1648 92 humidified;nasal cannula 4 -- --    07/27/25 1600 -- humidified;nasal cannula 4 -- --    07/27/25 1410 93 -- -- -- --    07/27/25 1400 -- humidified;nasal cannula 4 -- --    07/27/25 1200 -- humidified;nasal cannula 4 -- --    07/27/25 1000 -- humidified;nasal cannula 4 -- --    07/27/25 0929 92 -- -- -- --    07/27/25 0822 -- humidified;nasal cannula 4 -- --    07/27/25 0821 90 -- -- -- --    07/27/25 0730 91 -- -- -- --    07/27/25 0700 90 -- -- -- --    07/27/25 0646 -- nasal cannula 2 -- --    07/27/25 0630 90 -- -- -- --    07/27/25 0600 89 -- -- -- --    07/27/25 0530 91 -- -- -- --    07/27/25 0500 90  nasal cannula 2 -- --    07/27/25 0440 89 room air -- -- --    07/27/25 0430 89 -- -- -- --    07/27/25 0410 93 -- -- -- --          Lines, Drains & Airways       Active LDAs       Name Placement date Placement time Site Days    Peripheral IV 07/27/25 0440 20 G Right Antecubital 07/27/25 0440  Antecubital  2                  Current Facility-Administered Medications   Medication Dose Route Frequency Provider Last Rate Last Admin    acetaminophen (TYLENOL) tablet 650 mg  650 mg Oral Q4H PRN Sayra Churchill DO        Or    acetaminophen (TYLENOL) 160 MG/5ML oral solution 650 mg  650 mg Oral Q4H PRN Sayra Churchill DO        Or    acetaminophen (TYLENOL) suppository 650 mg  650 mg Rectal Q4H PRN Sayra Churchill DO        amLODIPine (NORVASC) tablet 5 mg  5 mg Oral Q24H Dasha Spangler DO   5 mg at 07/29/25 0814    atenolol (TENORMIN) tablet 25 mg  25 mg Oral Daily Sayra Churchill, DO   25 mg at 07/29/25 0814    sennosides-docusate (PERICOLACE) 8.6-50 MG per tablet 2 tablet  2 tablet Oral BID PRN Sayra Churchill DO        And    polyethylene glycol (MIRALAX) packet 17 g  17 g Oral Daily PRN Sayra Churchill DO        And    bisacodyl (DULCOLAX) EC tablet 5 mg  5 mg Oral Daily PRN Sayra Churchill DO        And    bisacodyl (DULCOLAX) suppository 10 mg  10 mg Rectal Daily PRN Sayra Churchill DO        busPIRone (BUSPAR) tablet 10 mg  10 mg Oral Q8H Sayra Churchill DO   10 mg at 07/29/25 0614    Calcium Replacement - Follow Nurse / BPA Driven Protocol   Not Applicable PRN Sayra Churchill DO        cefTRIAXone (ROCEPHIN) 1,000 mg in sodium chloride 0.9 % 100 mL MBP  1,000 mg Intravenous Q24H Dasha Spangler DO        citalopram (CeleXA) tablet 20 mg  20 mg Oral Daily Sayra Churchill DO   20 mg at 07/29/25 0814    doxycycline (MONODOX) capsule 100 mg  100 mg Oral Q12H Dasha Spangler DO   100 mg at 07/29/25 0814     DULoxetine (CYMBALTA) DR capsule 60 mg  60 mg Oral Daily Sayra Churchill DO   60 mg at 07/29/25 0814    enoxaparin sodium (LOVENOX) syringe 40 mg  40 mg Subcutaneous Q24H Dasha Spangler DO   40 mg at 07/29/25 0814    fluticasone (FLONASE) 50 MCG/ACT nasal spray 2 spray  2 spray Nasal Daily Sayra Churchill DO   2 spray at 07/29/25 0819    furosemide (LASIX) injection 20 mg  20 mg Intravenous Once Dasha Spangler DO        ipratropium-albuterol (DUO-NEB) nebulizer solution 3 mL  3 mL Nebulization Q4H - RT Dasha Spangler DO   3 mL at 07/29/25 1229    lisinopril (PRINIVIL,ZESTRIL) tablet 40 mg  40 mg Oral Q24H Dasha Spangler DO   40 mg at 07/29/25 0814    Magnesium Standard Dose Replacement - Follow Nurse / BPA Driven Protocol   Not Applicable PRN Sayra Churchill DO        melatonin tablet 5 mg  5 mg Oral Nightly PRN Heidy Celestin APRN   5 mg at 07/27/25 2035    ondansetron (ZOFRAN) injection 4 mg  4 mg Intravenous Q6H PRN Sayra Churchill DO        Phosphorus Replacement - Follow Nurse / BPA Driven Protocol   Not Applicable PRN Sayra Churchill DO        Potassium Replacement - Follow Nurse / BPA Driven Protocol   Not Applicable PRN Sayra Churchill DO        predniSONE (DELTASONE) tablet 40 mg  40 mg Oral Daily With Breakfast Dasha Spangler DO   40 mg at 07/29/25 0814    QUEtiapine (SEROquel) tablet 25 mg  25 mg Oral Nightly PRN Dasha Spangler DO   25 mg at 07/28/25 2052    sodium chloride 0.9 % flush 10 mL  10 mL Intravenous PRN Clint Contreras MD        sodium chloride 0.9 % flush 10 mL  10 mL Intravenous Q12H Sayra Churchill DO   10 mL at 07/29/25 0819    sodium chloride 0.9 % flush 10 mL  10 mL Intravenous PRN Sayra Churchill DO        sodium chloride 0.9 % infusion 40 mL  40 mL Intravenous PRN Sayra Churchill,          Lab Results (last 48 hours)       Procedure Component Value Units Date/Time    Basic  Metabolic Panel [[id number]]  (Abnormal) Collected: 07/28/25 0414    Specimen: Blood Updated: 07/28/25 0514     Glucose 162 mg/dL      BUN 26.8 mg/dL      Creatinine 1.09 mg/dL      Sodium 137 mmol/L      Potassium 4.3 mmol/L      Comment: Specimen hemolyzed.  Result may be falsely elevated.        Chloride 102 mmol/L      CO2 20.1 mmol/L      Calcium 8.3 mg/dL      BUN/Creatinine Ratio 24.6     Anion Gap 14.9 mmol/L      eGFR 52.1 mL/min/1.73     Narrative:      GFR Categories in Chronic Kidney Disease (CKD)              GFR Category          GFR (mL/min/1.73)    Interpretation  G1                    90 or greater        Normal or high (1)  G2                    60-89                Mild decrease (1)  G3a                   45-59                Mild to moderate decrease  G3b                   30-44                Moderate to severe decrease  G4                    15-29                Severe decrease  G5                    14 or less           Kidney failure    (1)In the absence of evidence of kidney disease, neither GFR category G1 or G2 fulfill the criteria for CKD.    eGFR calculation 2021 CKD-EPI creatinine equation, which does not include race as a factor    Magnesium [[id number]]  (Normal) Collected: 07/28/25 0414    Specimen: Blood Updated: 07/28/25 0514     Magnesium 2.2 mg/dL     Phosphorus [[id number]]  (Normal) Collected: 07/28/25 0414    Specimen: Blood Updated: 07/28/25 0511     Phosphorus 3.6 mg/dL     CBC Auto Differential [[id number]]  (Abnormal) Collected: 07/28/25 0414    Specimen: Blood Updated: 07/28/25 0446     WBC 15.03 10*3/mm3      RBC 4.66 10*6/mm3      Hemoglobin 14.3 g/dL      Hematocrit 42.8 %      MCV 91.8 fL      MCH 30.7 pg      MCHC 33.4 g/dL      RDW 14.6 %      RDW-SD 49.1 fl      MPV 11.4 fL      Platelets 251 10*3/mm3      Neutrophil % 83.9 %      Lymphocyte % 6.5 %      Monocyte % 8.6 %      Eosinophil % 0.1 %      Basophil % 0.2 %      Immature Grans % 0.7 %      Neutrophils,  Absolute 12.62 10*3/mm3      Lymphocytes, Absolute 0.97 10*3/mm3      Monocytes, Absolute 1.30 10*3/mm3      Eosinophils, Absolute 0.01 10*3/mm3      Basophils, Absolute 0.03 10*3/mm3      Immature Grans, Absolute 0.10 10*3/mm3      nRBC 0.0 /100 WBC     Urinalysis With Microscopic If Indicated (No Culture) - Urine, Clean Catch [[id number]]  (Abnormal) Collected: 07/27/25 1614    Specimen: Urine, Clean Catch Updated: 07/27/25 1626     Color, UA Yellow     Appearance, UA Clear     pH, UA <=5.0     Specific Gravity, UA 1.010     Glucose,  mg/dL (1+)     Ketones, UA Negative     Bilirubin, UA Negative     Blood, UA Negative     Protein, UA Negative     Leuk Esterase, UA Negative     Nitrite, UA Negative     Urobilinogen, UA 0.2 E.U./dL    Narrative:      Urine microscopic not indicated.          Imaging Results (Last 48 Hours)       Procedure Component Value Units Date/Time    CT Chest Without Contrast Diagnostic [[id number]] Collected: 07/29/25 0903     Updated: 07/29/25 0909    Narrative:      CT CHEST WO CONTRAST DIAGNOSTIC    Date of Exam: 7/29/2025 8:29 AM EDT    Indication: worsening hypoxia, elevated WBC.    Comparison: 7/27/2025    Technique: Axial CT images were obtained of the chest without contrast administration.  Reconstructed coronal and sagittal images were also obtained. Automated exposure control and iterative construction methods were used.      Findings:  There is near complete right and left lower lobar consolidation and atelectasis with volume loss. The upper lobes are relatively free of disease, as is the right middle lobe.    The thyroid is normal. No adenopathy is identified. Mild dilation of the ascending aorta measuring 4.2 cm. Moderate coronary artery calcifications. Esophagus is unremarkable. There are small bilateral pleural effusions. No adenopathy is identified.   Limited evaluation of the upper abdomen is unremarkable.    No aggressive appearing lytic or sclerotic bone lesions are  identified. Multiple kyphoplasty changes are identified.      Impression:      Impression:  1.Near complete right and left lower lobar consolidation and atelectasis with volume loss. No definite evidence of pneumonia, however cannot exclude superimposed pneumonia.  2.Small bilateral pleural effusions.  3.Mild dilation of the ascending aorta measuring 4.2 cm.        Electronically Signed: Miguel Lewis MD    7/29/2025 9:06 AM EDT    Workstation ID: [id number]          ECG/EMG Results (last 48 hours)       Procedure Component Value Units Date/Time    Adult Transthoracic Echo Complete W/ Cont if Necessary Per Protocol [[id number]] Resulted: 07/27/25 1342     Updated: 07/27/25 1342     EF(MOD-bp) 79.3 %      LVIDd 4.2 cm      LVIDs 2.5 cm      IVSd 1.00 cm      LVPWd 1.00 cm      FS 40.5 %      IVS/LVPW 1.00 cm      ESV(cubed) 15.6 ml      LV Sys Vol (BSA corrected) 6.2 cm2      EDV(cubed) 74.1 ml      LV Mccollum Vol (BSA corrected) 30.6 cm2      LV mass(C)d 137.2 grams      LVOT area 2.27 cm2      LVOT diam 1.70 cm      EDV(MOD-sp2) 47.2 ml      EDV(MOD-sp4) 49.1 ml      ESV(MOD-sp2) 9.9 ml      ESV(MOD-sp4) 9.9 ml      SV(MOD-sp2) 37.3 ml      SV(MOD-sp4) 39.2 ml      SVi(MOD-SP2) 23.3 ml/m2      SVi(MOD-SP4) 24.4 ml/m2      SVi (LVOT) 29.8 ml/m2      EF(MOD-sp2) 79.1 %      EF(MOD-sp4) 79.8 %      MV E max balta 61.8 cm/sec      MV A max balta 101.0 cm/sec      MV dec time 0.16 sec      MV E/A 0.61     LA ESV Index (BP) 9.5 ml/m2      Med Peak E' Balta 7.2 cm/sec      Lat Peak E' Balta 8.2 cm/sec      Avg E/e' ratio 8.03     SV(LVOT) 47.9 ml      RV Base 2.40 cm      RV Mid 2.10 cm      RV Length 5.8 cm      TAPSE (>1.6) 1.35 cm      RV S' 23.3 cm/sec      LA dimension (2D)  2.40 cm      LV V1 max 144.0 cm/sec      LV V1 max PG 8.3 mmHg      LV V1 mean PG 5.0 mmHg      LV V1 VTI 21.1 cm      Ao pk balta 158.0 cm/sec      Ao max PG 10.0 mmHg      Ao mean PG 5.0 mmHg      Ao V2 VTI 24.2 cm      JAIRO(I,D) 1.98 cm2      Dimensionless  Index 0.87 (DI)      MV max PG 4.1 mmHg      MV mean PG 3.0 mmHg      MV V2 VTI 15.1 cm      MV P1/2t 60.9 msec      MVA(P1/2t) 3.6 cm2      MVA(VTI) 3.2 cm2      MV dec slope 385.0 cm/sec2      Ao root diam 3.2 cm      RAP systole 3 mmHg      Ascending aorta 3.7 cm     Narrative:        Left ventricular systolic function is normal. Left ventricular ejection   fraction appears to be 66 - 70%.    Left ventricular wall thickness is consistent with hypertrophy.   Sigmoid-shaped ventricular septum is present.    Left ventricular diastolic function is consistent with (grade I)   impaired relaxation.    Trace aortic regurgitation.    Trace mitral regurgitation.    Left ventricular intracavitary gradient is noted noted any evidence of   outflow tract gradient.               Physician Progress Notes (last 48 hours)        Dasha Spangler DO at 25 1220              [facility] Medicine Services  PROGRESS NOTE    Patient Name: Stefanie Russell  : 1949  MRN: [id number]    Date of Admission: 2025  Primary Care Physician: Eric Brunson DO    Subjective   Subjective     CC:  F/u respiratory failure    HPI:  Patient sitting up in bedside chair. States she is feeling better, has been up walking around her room.      Objective   Objective     Vital Signs:   Temp:  [97.4 °F (36.3 °C)-98.5 °F (36.9 °C)] 97.6 °F (36.4 °C)  Heart Rate:  [74-95] 78  Resp:  [16-18] 16  BP: (111-148)/(58-85) 130/64  Flow (L/min) (Oxygen Therapy):  [2-4] 3     Physical Exam:  Constitutional: No acute distress, awake, alert  HENT: NCAT, mucous membranes moist  Respiratory: wheezing has improved, clear bilaterally with blunted in bilateral bases, on 3L  Cardiovascular: RRR, no murmurs, rubs, or gallops  Gastrointestinal: soft, nontender, nondistended  Musculoskeletal: No bilateral ankle edema  Psychiatric: Appropriate affect, cooperative  Neurologic: Oriented x 3, speech clear, no focal deficits  Skin: No  jorje      Results Reviewed:  LAB RESULTS:      Lab 07/28/25  0414 07/27/25  0545 07/27/25  0433   WBC 15.03*  --  11.06*   HEMOGLOBIN 14.3  --  15.9   HEMATOCRIT 42.8  --  49.2*   PLATELETS 251  --  301   NEUTROS ABS 12.62*  --  6.18   IMMATURE GRANS (ABS) 0.10*  --  0.05   LYMPHS ABS 0.97  --  3.02   MONOS ABS 1.30*  --  1.30*   EOS ABS 0.01  --  0.39   MCV 91.8  --  92.8   PROCALCITONIN  --  0.06  --    HSTROP T  --  11 14*         Lab 07/28/25  0414 07/27/25  0545 07/27/25  0433   SODIUM 137  --  140   POTASSIUM 4.3  --  3.9   CHLORIDE 102  --  106   CO2 20.1*  --  21.8*   ANION GAP 14.9  --  12.2   BUN 26.8*  --  23.0   CREATININE 1.09*  --  1.31*   EGFR 52.1*  --  41.8*   GLUCOSE 162*  --  116*   CALCIUM 8.3*  --  8.9   MAGNESIUM 2.2  --   --    PHOSPHORUS 3.6  --   --    TSH  --  1.050  --          Lab 07/27/25  0433   TOTAL PROTEIN 7.2   ALBUMIN 3.9   GLOBULIN 3.3   ALT (SGPT) 6   AST (SGOT) 17   BILIRUBIN 0.5   ALK PHOS 80         Lab 07/27/25  0545 07/27/25 0433   PROBNP  --  432.0   HSTROP T 11 14*                 Lab 07/27/25  0449   PH, ARTERIAL 7.440   PCO2, ARTERIAL 33.2*   PO2 ART 70.6*   FIO2 21   HCO3 ART 22.5   BASE EXCESS ART -0.8*   CARBOXYHEMOGLOBIN 1.3     Brief Urine Lab Results  (Last result in the past 365 days)        Color   Clarity   Blood   Leuk Est   Nitrite   Protein   CREAT   Urine HCG        07/27/25 1614 Yellow   Clear   Negative   Negative   Negative   Negative                   Microbiology Results Abnormal       None            CT Chest Without Contrast Diagnostic  Result Date: 7/29/2025  CT CHEST WO CONTRAST DIAGNOSTIC Date of Exam: 7/29/2025 8:29 AM EDT Indication: worsening hypoxia, elevated WBC. Comparison: 7/27/2025 Technique: Axial CT images were obtained of the chest without contrast administration.  Reconstructed coronal and sagittal images were also obtained. Automated exposure control and iterative construction methods were used. Findings: There is near complete  right and left lower lobar consolidation and atelectasis with volume loss. The upper lobes are relatively free of disease, as is the right middle lobe. The thyroid is normal. No adenopathy is identified. Mild dilation of the ascending aorta measuring 4.2 cm. Moderate coronary artery calcifications. Esophagus is unremarkable. There are small bilateral pleural effusions. No adenopathy is identified. Limited evaluation of the upper abdomen is unremarkable. No aggressive appearing lytic or sclerotic bone lesions are identified. Multiple kyphoplasty changes are identified.     Impression: Impression: 1.Near complete right and left lower lobar consolidation and atelectasis with volume loss. No definite evidence of pneumonia, however cannot exclude superimposed pneumonia. 2.Small bilateral pleural effusions. 3.Mild dilation of the ascending aorta measuring 4.2 cm. Electronically Signed: Miguel Lewis MD  7/29/2025 9:06 AM EDT  Workstation ID: [id number]      Results for orders placed during the hospital encounter of 07/27/25    Adult Transthoracic Echo Complete W/ Cont if Necessary Per Protocol 07/27/2025  1:42 PM    Interpretation Summary    Left ventricular systolic function is normal. Left ventricular ejection fraction appears to be 66 - 70%.    Left ventricular wall thickness is consistent with hypertrophy. Sigmoid-shaped ventricular septum is present.    Left ventricular diastolic function is consistent with (grade I) impaired relaxation.    Trace aortic regurgitation.    Trace mitral regurgitation.    Left ventricular intracavitary gradient is noted noted any evidence of outflow tract gradient.      Current medications:  Scheduled Meds:amLODIPine, 5 mg, Oral, Q24H  atenolol, 25 mg, Oral, Daily  busPIRone, 10 mg, Oral, Q8H  cefTRIAXone, 1,000 mg, Intravenous, Q24H  citalopram, 20 mg, Oral, Daily  doxycycline, 100 mg, Oral, Q12H  DULoxetine, 60 mg, Oral, Daily  enoxaparin sodium, 40 mg, Subcutaneous, Q24H  fluticasone, 2  spray, Nasal, Daily  ipratropium-albuterol, 3 mL, Nebulization, Q4H - RT  lisinopril, 40 mg, Oral, Q24H  predniSONE, 40 mg, Oral, Daily With Breakfast  sodium chloride, 10 mL, Intravenous, Q12H      Continuous Infusions:   PRN Meds:.  acetaminophen **OR** acetaminophen **OR** acetaminophen    senna-docusate sodium **AND** polyethylene glycol **AND** bisacodyl **AND** bisacodyl    Calcium Replacement - Follow Nurse / BPA Driven Protocol    Magnesium Standard Dose Replacement - Follow Nurse / BPA Driven Protocol    melatonin    ondansetron    Phosphorus Replacement - Follow Nurse / BPA Driven Protocol    Potassium Replacement - Follow Nurse / BPA Driven Protocol    QUEtiapine    sodium chloride    sodium chloride    sodium chloride    Assessment & Plan   Assessment & Plan     Active Hospital Problems    Diagnosis  POA    **Acute exacerbation of chronic obstructive pulmonary disease (COPD) [J44.1]  Yes    Hypertension [I10]  Yes    Hyperlipidemia [E78.5]  Yes    Dysphagia [R13.10]  Yes    Frequent falls [R29.6]  Not Applicable      Resolved Hospital Problems   No resolved problems to display.        Brief Hospital Course to date:  Steafnie Russell is a 76 y.o. female with PMHx significant for HTN, HLD, COPD, anxiety/depression, GERD, CKDII, osteoporosis who presents with increasing SOA.    Acute Respiratory Failure w/Hypoxia  AECOPD  Bilateral Lower Lobe Atelectasis vs. PNA  - CXR without acute fidings, small effusions, no pneumonia. CT chest completed this morning showing near complete right and left lower lobar consolidation and atelectasis with volume loss, unable to exclude pneumonia  - on no baseline O2, currently on 3L  - schedule duo-nebs,, add incentive spirometry and OPEP  - empiric doxy x 5 days, mucinex. Add CTX given imaging findings and increasing WBC  - short steroid burst, prednisone x 5 days  - Echo showing normal EF 66-70% and in setting of normal BNP doubt significant volume overload contributing,  dose diuretics PRN. Will give additional dose of lasix today.    HTN  - continue atenolol, lisinopril    HLD:  - continue statin    CKDII:  - creatinine at baseline    Anxiety/Depression:  - continue home regimen    Expected Discharge Location and Transportation: home  Expected Discharge   Expected discharge date/ time has not been documented.     VTE Prophylaxis:  Pharmacologic & mechanical VTE prophylaxis orders are present.         AM-PAC 6 Clicks Score (PT): 21 (25 0952)    CODE STATUS:   Code Status and Medical Interventions: CPR (Attempt to Resuscitate); Full Support   Ordered at: 25 0652     Code Status (Patient has no pulse and is not breathing):    CPR (Attempt to Resuscitate)     Medical Interventions (Patient has pulse or is breathing):    Full Support       Dasha Spangler DO  25        Electronically signed by Dasha Spangler DO at 25 1223       Dasha Spangler DO at 25 1203              [facility] Medicine Services  PROGRESS NOTE    Patient Name: Stefanie Russell  : 1949  MRN: [id number]    Date of Admission: 2025  Primary Care Physician: Eric Brunson DO    Subjective   Subjective     CC:  F/u respiratory failure    HPI:  Patient sitting up in bedside chair, reports that her breathing is better, however still wheezing. Reports she is on no baseline oxygen at home, currently requiring 4L      Objective   Objective     Vital Signs:   Temp:  [97.9 °F (36.6 °C)-98.4 °F (36.9 °C)] 97.9 °F (36.6 °C)  Heart Rate:  [76-86] 81  Resp:  [16-20] 16  BP: (135-164)/(73-85) 164/85  Flow (L/min) (Oxygen Therapy):  [4] 4     Physical Exam:  Constitutional: No acute distress, awake, alert  HENT: NCAT, mucous membranes moist  Respiratory: bilateral expiratory wheezing present, non-labored on 4L  Cardiovascular: RRR, no murmurs, rubs, or gallops  Gastrointestinal: soft, nontender, nondistended  Musculoskeletal: No bilateral ankle  edema  Psychiatric: Appropriate affect, cooperative  Neurologic: Oriented x 3, speech clear, no focal deficits  Skin: No rashes      Results Reviewed:  LAB RESULTS:      Lab 07/28/25  0414 07/27/25  0545 07/27/25 0433   WBC 15.03*  --  11.06*   HEMOGLOBIN 14.3  --  15.9   HEMATOCRIT 42.8  --  49.2*   PLATELETS 251  --  301   NEUTROS ABS 12.62*  --  6.18   IMMATURE GRANS (ABS) 0.10*  --  0.05   LYMPHS ABS 0.97  --  3.02   MONOS ABS 1.30*  --  1.30*   EOS ABS 0.01  --  0.39   MCV 91.8  --  92.8   PROCALCITONIN  --  0.06  --    HSTROP T  --  11 14*         Lab 07/28/25 0414 07/27/25  0545 07/27/25 0433   SODIUM 137  --  140   POTASSIUM 4.3  --  3.9   CHLORIDE 102  --  106   CO2 20.1*  --  21.8*   ANION GAP 14.9  --  12.2   BUN 26.8*  --  23.0   CREATININE 1.09*  --  1.31*   EGFR 52.1*  --  41.8*   GLUCOSE 162*  --  116*   CALCIUM 8.3*  --  8.9   MAGNESIUM 2.2  --   --    PHOSPHORUS 3.6  --   --    TSH  --  1.050  --          Lab 07/27/25 0433   TOTAL PROTEIN 7.2   ALBUMIN 3.9   GLOBULIN 3.3   ALT (SGPT) 6   AST (SGOT) 17   BILIRUBIN 0.5   ALK PHOS 80         Lab 07/27/25  0545 07/27/25 0433   PROBNP  --  432.0   HSTROP T 11 14*                 Lab 07/27/25 0449   PH, ARTERIAL 7.440   PCO2, ARTERIAL 33.2*   PO2 ART 70.6*   FIO2 21   HCO3 ART 22.5   BASE EXCESS ART -0.8*   CARBOXYHEMOGLOBIN 1.3     Brief Urine Lab Results  (Last result in the past 365 days)        Color   Clarity   Blood   Leuk Est   Nitrite   Protein   CREAT   Urine HCG        07/27/25 1614 Yellow   Clear   Negative   Negative   Negative   Negative                   Microbiology Results Abnormal       None            XR Chest 1 View  Result Date: 7/27/2025  XR CHEST 1 VW Date of Exam: 7/27/2025 4:47 AM EDT Indication: SOA triage protocol Comparison: 3/25/2022. Findings: There are no airspace consolidations. Small bilateral pleural effusions are present.. No pneumothorax. The pulmonary vasculature appears within normal limits. The cardiac and  mediastinal silhouette appear unremarkable. No acute osseous abnormality identified.     Impression: Impression: Small bilateral pleural effusions. Electronically Signed: Tracey Reddy MD  7/27/2025 5:50 AM EDT  Workstation ID: [id number]      Results for orders placed during the hospital encounter of 07/27/25    Adult Transthoracic Echo Complete W/ Cont if Necessary Per Protocol 07/27/2025  1:42 PM    Interpretation Summary    Left ventricular systolic function is normal. Left ventricular ejection fraction appears to be 66 - 70%.    Left ventricular wall thickness is consistent with hypertrophy. Sigmoid-shaped ventricular septum is present.    Left ventricular diastolic function is consistent with (grade I) impaired relaxation.    Trace aortic regurgitation.    Trace mitral regurgitation.    Left ventricular intracavitary gradient is noted noted any evidence of outflow tract gradient.      Current medications:  Scheduled Meds:atenolol, 25 mg, Oral, Daily  busPIRone, 10 mg, Oral, Q8H  citalopram, 20 mg, Oral, Daily  DULoxetine, 60 mg, Oral, Daily  enoxaparin sodium, 40 mg, Subcutaneous, Q24H  fluticasone, 2 spray, Nasal, Daily  ipratropium-albuterol, 3 mL, Nebulization, Q4H - RT  predniSONE, 40 mg, Oral, Daily With Breakfast  sodium chloride, 10 mL, Intravenous, Q12H      Continuous Infusions:   PRN Meds:.  acetaminophen **OR** acetaminophen **OR** acetaminophen    senna-docusate sodium **AND** polyethylene glycol **AND** bisacodyl **AND** bisacodyl    Calcium Replacement - Follow Nurse / BPA Driven Protocol    Magnesium Standard Dose Replacement - Follow Nurse / BPA Driven Protocol    melatonin    ondansetron    Phosphorus Replacement - Follow Nurse / BPA Driven Protocol    Potassium Replacement - Follow Nurse / BPA Driven Protocol    sodium chloride    sodium chloride    sodium chloride    Assessment & Plan   Assessment & Plan     Active Hospital Problems    Diagnosis  POA    **Acute exacerbation of chronic  obstructive pulmonary disease (COPD) [J44.1]  Yes    Hypertension [I10]  Yes    Hyperlipidemia [E78.5]  Yes    Dysphagia [R13.10]  Yes    Frequent falls [R29.6]  Not Applicable      Resolved Hospital Problems   No resolved problems to display.        Brief Hospital Course to date:  Stefanie Russell is a 76 y.o. female with PMHx significant for HTN, HLD, COPD, anxiety/depression, GERD, CKDII, osteoporosis who presents with increasing SOA.    Acute Respiratory Failure w/Hypoxia  AECOPD  - CXR without acute fidings, small effusions, no pneumonia. consider CT chest if no improvement  - on no baseline O2, currently on 4L  - schedule duo-nebs  - empiric doxy x 5 days, mucinex  - short steroid burst, prednisone x 5 days  - Echo showing normal EF 66-70% and in setting of normal BNP doubt significant volume overload contributing, hold further diuretics and monitor    HTN  - continue atenolol, resume lisinopril    HLD:  - continue statin    CKDII:  - creatinine at baseline    Anxiety/Depression:  - continue home regimen    Expected Discharge Location and Transportation: home  Expected Discharge   Expected discharge date/ time has not been documented.     VTE Prophylaxis:  Pharmacologic & mechanical VTE prophylaxis orders are present.         AM-PAC 6 Clicks Score (PT): 21 (07/28/25 0970)    CODE STATUS:   Code Status and Medical Interventions: CPR (Attempt to Resuscitate); Full Support   Ordered at: 07/27/25 0649     Code Status (Patient has no pulse and is not breathing):    CPR (Attempt to Resuscitate)     Medical Interventions (Patient has pulse or is breathing):    Full Support       Dasha Spangler DO  07/28/25        Electronically signed by Dasha Spangler DO at 07/29/25 0820       Consult Notes (last 48 hours)  Notes from 07/27/25 1240 through 07/29/25 1240   No notes of this type exist for this encounter.